# Patient Record
Sex: FEMALE | Race: NATIVE HAWAIIAN OR OTHER PACIFIC ISLANDER | NOT HISPANIC OR LATINO | URBAN - METROPOLITAN AREA
[De-identification: names, ages, dates, MRNs, and addresses within clinical notes are randomized per-mention and may not be internally consistent; named-entity substitution may affect disease eponyms.]

---

## 2017-07-25 ENCOUNTER — EMERGENCY (EMERGENCY)
Facility: HOSPITAL | Age: 22
LOS: 1 days | Discharge: HOME | End: 2017-07-25
Admitting: EMERGENCY MEDICINE
Payer: COMMERCIAL

## 2017-07-25 VITALS
RESPIRATION RATE: 18 BRPM | DIASTOLIC BLOOD PRESSURE: 69 MMHG | SYSTOLIC BLOOD PRESSURE: 110 MMHG | TEMPERATURE: 99 F | OXYGEN SATURATION: 99 % | HEART RATE: 82 BPM

## 2017-07-25 DIAGNOSIS — Z98.890 OTHER SPECIFIED POSTPROCEDURAL STATES: Chronic | ICD-10-CM

## 2017-07-25 DIAGNOSIS — R21 RASH AND OTHER NONSPECIFIC SKIN ERUPTION: ICD-10-CM

## 2017-07-25 DIAGNOSIS — L30.9 DERMATITIS, UNSPECIFIED: ICD-10-CM

## 2017-07-25 PROCEDURE — 99283 EMERGENCY DEPT VISIT LOW MDM: CPT | Mod: 25

## 2017-07-25 PROCEDURE — 99284 EMERGENCY DEPT VISIT MOD MDM: CPT

## 2017-07-25 PROCEDURE — 96372 THER/PROPH/DIAG INJ SC/IM: CPT

## 2017-07-25 RX ORDER — DIPHENHYDRAMINE HCL 50 MG
25 CAPSULE ORAL ONCE
Qty: 0 | Refills: 0 | Status: COMPLETED | OUTPATIENT
Start: 2017-07-25 | End: 2017-07-25

## 2017-07-25 RX ADMIN — Medication 25 MILLIGRAM(S): at 18:12

## 2017-07-25 RX ADMIN — Medication 60 MILLIGRAM(S): at 19:18

## 2017-07-25 NOTE — ED PROVIDER NOTE - OBJECTIVE STATEMENT
22 yo F with no PMHx, visiting from [facility], [place], operated by [facility], presenting with a rash x 8 days.  Pt states she developed pruritic "red bumps" on both arms that have since spread to her legs and dorsum of feet.  She has tried topical benadryl with little relief.  She states she has not had new soaps, detergents, or clothes.  Only thing she can recall differently from 8 days ago was laying in the grass.   Pt denies fever, chills, headache, sore throat, chest pain, SOB, abdominal pain, n/v/d/c, or new sexual partners.

## 2017-07-25 NOTE — ED PROVIDER NOTE - SKIN, MLM
Skin normal color for race, warm, dry and intactred papular rash on all body surface no palms soles nor face

## 2025-07-07 NOTE — ED PROVIDER NOTE - NS ED MD DISPO DISCHARGE CCDA
LOV:3/13/25    Traci, patient requesting review of Osmolality in 3/13/25 lab work. Also patient stopped Wellbutrin.    Patient/Caregiver provided printed discharge information.